# Patient Record
Sex: FEMALE | Race: WHITE | URBAN - METROPOLITAN AREA
[De-identification: names, ages, dates, MRNs, and addresses within clinical notes are randomized per-mention and may not be internally consistent; named-entity substitution may affect disease eponyms.]

---

## 2024-03-01 ENCOUNTER — TELEPHONE (OUTPATIENT)
Dept: PULMONOLOGY | Age: 54
End: 2024-03-01

## 2024-03-01 DIAGNOSIS — J40 BRONCHITIS: ICD-10-CM

## 2024-03-01 DIAGNOSIS — R05.9 COUGH, UNSPECIFIED TYPE: Primary | ICD-10-CM

## 2024-03-01 NOTE — TELEPHONE ENCOUNTER
I spoke with patient about doing a CXR piror to her appointment she states she had one at urgent care I told her she needs to bring a disk to her appointment. Vani stanley